# Patient Record
Sex: FEMALE | Race: WHITE | NOT HISPANIC OR LATINO | Employment: OTHER | URBAN - METROPOLITAN AREA
[De-identification: names, ages, dates, MRNs, and addresses within clinical notes are randomized per-mention and may not be internally consistent; named-entity substitution may affect disease eponyms.]

---

## 2017-11-27 ENCOUNTER — HISTORICAL (OUTPATIENT)
Dept: RADIOLOGY | Facility: HOSPITAL | Age: 47
End: 2017-11-27

## 2018-06-01 ENCOUNTER — HISTORICAL (OUTPATIENT)
Dept: ADMINISTRATIVE | Facility: HOSPITAL | Age: 48
End: 2018-06-01

## 2018-06-01 LAB
ABS NEUT (OLG): 3.7
ALBUMIN SERPL-MCNC: 4.7 GM/DL (ref 3.4–5)
ALBUMIN/GLOB SERPL: 1.81 {RATIO} (ref 1.5–2.5)
ALP SERPL-CCNC: 51 UNIT/L (ref 38–126)
ALT SERPL-CCNC: 66 UNIT/L (ref 7–52)
APPEARANCE, UA: CLEAR
AST SERPL-CCNC: 37 UNIT/L (ref 15–37)
BACTERIA #/AREA URNS AUTO: ABNORMAL /HPF
BILIRUB SERPL-MCNC: 0.6 MG/DL (ref 0.2–1)
BILIRUB UR QL STRIP: NEGATIVE MG/DL
BILIRUBIN DIRECT+TOT PNL SERPL-MCNC: 0.1 MG/DL (ref 0–0.5)
BILIRUBIN DIRECT+TOT PNL SERPL-MCNC: 0.5 MG/DL
BUN SERPL-MCNC: 20 MG/DL (ref 7–18)
CALCIUM SERPL-MCNC: 9.7 MG/DL (ref 8.5–10)
CHLORIDE SERPL-SCNC: 105 MMOL/L (ref 98–107)
CHOLEST SERPL-MCNC: 195 MG/DL (ref 0–200)
CHOLEST/HDLC SERPL: 2.6 {RATIO}
CO2 SERPL-SCNC: 28 MMOL/L (ref 21–32)
COLOR UR: YELLOW
CREAT SERPL-MCNC: 0.73 MG/DL (ref 0.6–1.3)
ERYTHROCYTE [DISTWIDTH] IN BLOOD BY AUTOMATED COUNT: 13.6 % (ref 11.5–17)
GLOBULIN SER-MCNC: 2.6 GM/DL (ref 1.2–3)
GLUCOSE (UA): NEGATIVE MG/DL
GLUCOSE SERPL-MCNC: 97 MG/DL (ref 74–106)
HCT VFR BLD AUTO: 39.7 % (ref 37–47)
HDLC SERPL-MCNC: 74 MG/DL (ref 35–60)
HGB BLD-MCNC: 12.9 GM/DL (ref 12–16)
HGB UR QL STRIP: ABNORMAL UNIT/L
KETONES UR QL STRIP: NEGATIVE MG/DL
LDLC SERPL CALC-MCNC: 99 MG/DL (ref 0–129)
LEUKOCYTE ESTERASE UR QL STRIP: NEGATIVE UNIT/L
LYMPHOCYTES # BLD AUTO: 2.3 X10(3)/MCL (ref 0.6–3.4)
LYMPHOCYTES NFR BLD AUTO: 32.7 % (ref 13–40)
MCH RBC QN AUTO: 32.5 PG (ref 27–31.2)
MCHC RBC AUTO-ENTMCNC: 32 GM/DL (ref 32–36)
MCV RBC AUTO: 100 FL (ref 80–94)
MONOCYTES # BLD AUTO: 1 X10(3)/MCL (ref 0–1.8)
MONOCYTES NFR BLD AUTO: 14.1 % (ref 0.1–24)
NEUTROPHILS NFR BLD AUTO: 53.2 % (ref 47–80)
NITRITE UR QL STRIP.AUTO: NEGATIVE
PH UR STRIP: 6.5 [PH]
PLATELET # BLD AUTO: 195 X10(3)/MCL (ref 130–400)
PMV BLD AUTO: 10.6 FL
POTASSIUM SERPL-SCNC: 5.1 MMOL/L (ref 3.5–5.1)
PROT SERPL-MCNC: 7.3 GM/DL (ref 6.4–8.2)
PROT UR QL STRIP: NEGATIVE MG/DL
RBC # BLD AUTO: 3.97 X10(6)/MCL (ref 4.2–5.4)
RBC #/AREA URNS HPF: ABNORMAL /HPF
SODIUM SERPL-SCNC: 138 MMOL/L (ref 136–145)
SP GR UR STRIP: 1.01
SQUAMOUS EPITHELIAL, UA: ABNORMAL /LPF
T3FREE SERPL-MCNC: 2.21 PG/ML (ref 1.45–3.48)
T4 FREE SERPL-MCNC: 1.11 NG/DL (ref 0.76–1.46)
TRIGL SERPL-MCNC: 45 MG/DL (ref 30–150)
TSH SERPL-ACNC: 2.6 MIU/ML (ref 0.35–4.94)
UROBILINOGEN UR STRIP-ACNC: 0.2 MG/DL
VLDLC SERPL CALC-MCNC: 9 MG/DL
WBC # SPEC AUTO: 7 X10(3)/MCL (ref 4.5–11.5)
WBC #/AREA URNS AUTO: ABNORMAL /[HPF]

## 2018-06-28 ENCOUNTER — HISTORICAL (OUTPATIENT)
Dept: ADMINISTRATIVE | Facility: HOSPITAL | Age: 48
End: 2018-06-28

## 2019-02-27 ENCOUNTER — HISTORICAL (OUTPATIENT)
Dept: RADIOLOGY | Facility: HOSPITAL | Age: 49
End: 2019-02-27

## 2019-12-10 ENCOUNTER — HISTORICAL (OUTPATIENT)
Dept: ADMINISTRATIVE | Facility: HOSPITAL | Age: 49
End: 2019-12-10

## 2019-12-10 LAB
ABS NEUT (OLG): 2.6 X10(3)/MCL (ref 2.1–9.2)
ALBUMIN SERPL-MCNC: 4.2 GM/DL (ref 3.4–5)
ALBUMIN/GLOB SERPL: 1.68 {RATIO} (ref 1.5–2.5)
ALP SERPL-CCNC: 36 UNIT/L (ref 38–126)
ALT SERPL-CCNC: 19 UNIT/L (ref 7–52)
APPEARANCE, UA: CLEAR
AST SERPL-CCNC: 18 UNIT/L (ref 15–37)
BACTERIA #/AREA URNS AUTO: ABNORMAL /HPF
BILIRUB SERPL-MCNC: 0.7 MG/DL (ref 0.2–1)
BILIRUB UR QL STRIP: NEGATIVE MG/DL
BILIRUBIN DIRECT+TOT PNL SERPL-MCNC: 0.2 MG/DL (ref 0–0.5)
BILIRUBIN DIRECT+TOT PNL SERPL-MCNC: 0.5 MG/DL
BUN SERPL-MCNC: 18 MG/DL (ref 7–18)
CALCIUM SERPL-MCNC: 9 MG/DL (ref 8.5–10)
CHLORIDE SERPL-SCNC: 104 MMOL/L (ref 98–107)
CHOLEST SERPL-MCNC: 159 MG/DL (ref 0–200)
CHOLEST/HDLC SERPL: 2.4 {RATIO}
CO2 SERPL-SCNC: 29 MMOL/L (ref 21–32)
COLOR UR: YELLOW
CREAT SERPL-MCNC: 0.75 MG/DL (ref 0.6–1.3)
ERYTHROCYTE [DISTWIDTH] IN BLOOD BY AUTOMATED COUNT: 13.1 % (ref 11.5–17)
GLOBULIN SER-MCNC: 2.5 GM/DL (ref 1.2–3)
GLUCOSE (UA): NEGATIVE MG/DL
GLUCOSE SERPL-MCNC: 99 MG/DL (ref 74–106)
HCT VFR BLD AUTO: 35.9 % (ref 37–47)
HDLC SERPL-MCNC: 66 MG/DL (ref 35–60)
HGB BLD-MCNC: 11.8 GM/DL (ref 12–16)
HGB UR QL STRIP: ABNORMAL UNIT/L
KETONES UR QL STRIP: NEGATIVE MG/DL
LDLC SERPL CALC-MCNC: 74 MG/DL (ref 0–129)
LEUKOCYTE ESTERASE UR QL STRIP: NEGATIVE UNIT/L
LYMPHOCYTES # BLD AUTO: 2.5 X10(3)/MCL (ref 0.6–3.4)
LYMPHOCYTES NFR BLD AUTO: 44.9 % (ref 13–40)
MCH RBC QN AUTO: 32 PG (ref 27–31.2)
MCHC RBC AUTO-ENTMCNC: 33 GM/DL (ref 32–36)
MCV RBC AUTO: 97 FL (ref 80–94)
MONOCYTES # BLD AUTO: 0.5 X10(3)/MCL (ref 0.1–1.3)
MONOCYTES NFR BLD AUTO: 9.8 % (ref 0.1–24)
NEUTROPHILS NFR BLD AUTO: 45.3 % (ref 47–80)
NITRITE UR QL STRIP.AUTO: NEGATIVE
PH UR STRIP: 7 [PH]
PLATELET # BLD AUTO: 196 X10(3)/MCL (ref 130–400)
PMV BLD AUTO: 10.5 FL (ref 9.4–12.4)
POTASSIUM SERPL-SCNC: 4.4 MMOL/L (ref 3.5–5.1)
PROT SERPL-MCNC: 6.7 GM/DL (ref 6.4–8.2)
PROT UR QL STRIP: NEGATIVE MG/DL
RBC # BLD AUTO: 3.69 X10(6)/MCL (ref 4.2–5.4)
RBC #/AREA URNS HPF: ABNORMAL /HPF
SODIUM SERPL-SCNC: 138 MMOL/L (ref 136–145)
SP GR UR STRIP: 1.01
SQUAMOUS EPITHELIAL, UA: ABNORMAL /LPF
TRIGL SERPL-MCNC: 61 MG/DL (ref 30–150)
UROBILINOGEN UR STRIP-ACNC: 0.2 MG/DL
VLDLC SERPL CALC-MCNC: 12.2 MG/DL
WBC # SPEC AUTO: 5.6 X10(3)/MCL (ref 4.5–11.5)
WBC #/AREA URNS AUTO: ABNORMAL /[HPF]

## 2020-09-11 ENCOUNTER — HISTORICAL (OUTPATIENT)
Dept: RADIOLOGY | Facility: HOSPITAL | Age: 50
End: 2020-09-11

## 2020-10-21 ENCOUNTER — HISTORICAL (OUTPATIENT)
Dept: ADMINISTRATIVE | Facility: HOSPITAL | Age: 50
End: 2020-10-21

## 2020-10-21 LAB
ABS NEUT (OLG): 4.1 X10(3)/MCL (ref 2.1–9.2)
ALBUMIN SERPL-MCNC: 4.5 GM/DL (ref 3.4–5)
ALBUMIN/GLOB SERPL: 1.88 {RATIO} (ref 1.5–2.5)
ALP SERPL-CCNC: 35 UNIT/L (ref 38–126)
ALT SERPL-CCNC: 16 UNIT/L (ref 7–52)
APPEARANCE, UA: CLEAR
AST SERPL-CCNC: 18 UNIT/L (ref 15–37)
BACTERIA #/AREA URNS AUTO: ABNORMAL /HPF
BILIRUB SERPL-MCNC: 0.7 MG/DL (ref 0.2–1)
BILIRUB UR QL STRIP: NEGATIVE MG/DL
BILIRUBIN DIRECT+TOT PNL SERPL-MCNC: 0.2 MG/DL (ref 0–0.5)
BILIRUBIN DIRECT+TOT PNL SERPL-MCNC: 0.5 MG/DL
BUN SERPL-MCNC: 21 MG/DL (ref 7–18)
CALCIUM SERPL-MCNC: 9.8 MG/DL (ref 8.5–10)
CHLORIDE SERPL-SCNC: 101 MMOL/L (ref 98–107)
CHOLEST SERPL-MCNC: 175 MG/DL (ref 0–200)
CHOLEST/HDLC SERPL: 2.6 {RATIO}
CO2 SERPL-SCNC: 33 MMOL/L (ref 21–32)
COLOR UR: YELLOW
CREAT SERPL-MCNC: 0.84 MG/DL (ref 0.6–1.3)
ERYTHROCYTE [DISTWIDTH] IN BLOOD BY AUTOMATED COUNT: 13.5 % (ref 11.5–17)
ESTRADIOL SERPL HS-MCNC: 111 PG/ML
FSH SERPL-ACNC: 6.64 MIU/ML
GLOBULIN SER-MCNC: 2.4 GM/DL (ref 1.2–3)
GLUCOSE (UA): NEGATIVE MG/DL
GLUCOSE SERPL-MCNC: 101 MG/DL (ref 74–106)
HCT VFR BLD AUTO: 38.5 % (ref 37–47)
HDLC SERPL-MCNC: 67 MG/DL (ref 35–60)
HGB BLD-MCNC: 12.9 GM/DL (ref 12–16)
HGB UR QL STRIP: ABNORMAL UNIT/L
KETONES UR QL STRIP: NEGATIVE MG/DL
LDLC SERPL CALC-MCNC: 97 MG/DL (ref 0–129)
LEUKOCYTE ESTERASE UR QL STRIP: NEGATIVE UNIT/L
LYMPHOCYTES # BLD AUTO: 2.7 X10(3)/MCL (ref 0.6–3.4)
LYMPHOCYTES NFR BLD AUTO: 36.2 % (ref 13–40)
MCH RBC QN AUTO: 32.4 PG (ref 27–31.2)
MCHC RBC AUTO-ENTMCNC: 34 GM/DL (ref 32–36)
MCV RBC AUTO: 97 FL (ref 80–94)
MONOCYTES # BLD AUTO: 0.7 X10(3)/MCL (ref 0.1–1.3)
MONOCYTES NFR BLD AUTO: 9.9 % (ref 0.1–24)
NEUTROPHILS NFR BLD AUTO: 53.9 % (ref 47–80)
NITRITE UR QL STRIP.AUTO: NEGATIVE
PH UR STRIP: 5 [PH]
PLATELET # BLD AUTO: 207 X10(3)/MCL (ref 130–400)
PMV BLD AUTO: 10.8 FL (ref 9.4–12.4)
POTASSIUM SERPL-SCNC: 4.7 MMOL/L (ref 3.5–5.1)
PROT SERPL-MCNC: 6.9 GM/DL (ref 6.4–8.2)
PROT UR QL STRIP: NEGATIVE MG/DL
RBC # BLD AUTO: 3.98 X10(6)/MCL (ref 4.2–5.4)
RBC #/AREA URNS HPF: ABNORMAL /HPF
SODIUM SERPL-SCNC: 139 MMOL/L (ref 136–145)
SP GR UR STRIP: 1.01
SQUAMOUS EPITHELIAL, UA: ABNORMAL /LPF
TRIGL SERPL-MCNC: 62 MG/DL (ref 30–150)
TSH SERPL-ACNC: 1.88 MIU/ML (ref 0.35–4.94)
UROBILINOGEN UR STRIP-ACNC: 0.2 MG/DL
VLDLC SERPL CALC-MCNC: 12.4 MG/DL
WBC # SPEC AUTO: 7.5 X10(3)/MCL (ref 4.5–11.5)
WBC #/AREA URNS AUTO: ABNORMAL /[HPF]

## 2021-09-01 ENCOUNTER — HISTORICAL (OUTPATIENT)
Dept: ADMINISTRATIVE | Facility: HOSPITAL | Age: 51
End: 2021-09-01

## 2021-09-01 LAB
ABS NEUT (OLG): 2.7 X10(3)/MCL (ref 2.1–9.2)
ALBUMIN SERPL-MCNC: 4.4 GM/DL (ref 3.4–5)
ALBUMIN/GLOB SERPL: 1.91 {RATIO} (ref 1.5–2.5)
ALP SERPL-CCNC: 39 UNIT/L (ref 38–126)
ALT SERPL-CCNC: 19 UNIT/L (ref 7–52)
APPEARANCE, UA: CLEAR
AST SERPL-CCNC: 19 UNIT/L (ref 15–37)
BACTERIA #/AREA URNS AUTO: ABNORMAL /HPF
BILIRUB SERPL-MCNC: 0.7 MG/DL (ref 0.2–1)
BILIRUB UR QL STRIP: NEGATIVE MG/DL
BILIRUBIN DIRECT+TOT PNL SERPL-MCNC: 0.2 MG/DL (ref 0–0.5)
BILIRUBIN DIRECT+TOT PNL SERPL-MCNC: 0.5 MG/DL
BUN SERPL-MCNC: 22 MG/DL (ref 7–18)
CALCIUM SERPL-MCNC: 9.7 MG/DL (ref 8.5–10)
CHLORIDE SERPL-SCNC: 102 MMOL/L (ref 98–107)
CHOLEST SERPL-MCNC: 182 MG/DL (ref 0–200)
CHOLEST/HDLC SERPL: 2.4 {RATIO}
CO2 SERPL-SCNC: 33 MMOL/L (ref 21–32)
COLOR UR: YELLOW
CREAT SERPL-MCNC: 0.81 MG/DL (ref 0.6–1.3)
ERYTHROCYTE [DISTWIDTH] IN BLOOD BY AUTOMATED COUNT: 13.8 % (ref 11.5–17)
GLOBULIN SER-MCNC: 2.3 GM/DL (ref 1.2–3)
GLUCOSE (UA): NEGATIVE MG/DL
GLUCOSE SERPL-MCNC: 95 MG/DL (ref 74–106)
HCT VFR BLD AUTO: 39.4 % (ref 37–47)
HDLC SERPL-MCNC: 77 MG/DL (ref 35–60)
HGB BLD-MCNC: 12.9 GM/DL (ref 12–16)
HGB UR QL STRIP: ABNORMAL UNIT/L
KETONES UR QL STRIP: NEGATIVE MG/DL
LDLC SERPL CALC-MCNC: 86 MG/DL (ref 0–129)
LEUKOCYTE ESTERASE UR QL STRIP: NEGATIVE UNIT/L
LYMPHOCYTES # BLD AUTO: 2.3 X10(3)/MCL (ref 0.6–3.4)
LYMPHOCYTES NFR BLD AUTO: 39.8 % (ref 13–40)
MCH RBC QN AUTO: 32.2 PG (ref 27–31.2)
MCHC RBC AUTO-ENTMCNC: 33 GM/DL (ref 32–36)
MCV RBC AUTO: 98 FL (ref 80–94)
MONOCYTES # BLD AUTO: 0.7 X10(3)/MCL (ref 0.1–1.3)
MONOCYTES NFR BLD AUTO: 11.7 % (ref 0.1–24)
NEUTROPHILS NFR BLD AUTO: 48.5 % (ref 47–80)
NITRITE UR QL STRIP.AUTO: NEGATIVE
PH UR STRIP: 7 [PH]
PLATELET # BLD AUTO: 201 X10(3)/MCL (ref 130–400)
PMV BLD AUTO: 11.8 FL (ref 9.4–12.4)
POTASSIUM SERPL-SCNC: 4.8 MMOL/L (ref 3.5–5.1)
PROT SERPL-MCNC: 6.7 GM/DL (ref 6.4–8.2)
PROT UR QL STRIP: NEGATIVE MG/DL
RBC # BLD AUTO: 4.01 X10(6)/MCL (ref 4.2–5.4)
RBC #/AREA URNS HPF: ABNORMAL /HPF
SODIUM SERPL-SCNC: 140 MMOL/L (ref 136–145)
SP GR UR STRIP: 1.02
SQUAMOUS EPITHELIAL, UA: ABNORMAL /LPF
T4 FREE SERPL-MCNC: 0.92 NG/DL (ref 0.76–1.46)
TRIGL SERPL-MCNC: 65 MG/DL (ref 30–150)
TSH SERPL-ACNC: 3.53 MIU/ML (ref 0.35–4.94)
UROBILINOGEN UR STRIP-ACNC: 0.2 MG/DL
VLDLC SERPL CALC-MCNC: 13 MG/DL
WBC # SPEC AUTO: 5.7 X10(3)/MCL (ref 4.5–11.5)
WBC #/AREA URNS AUTO: ABNORMAL /[HPF]

## 2021-09-16 ENCOUNTER — HISTORICAL (OUTPATIENT)
Dept: ADMINISTRATIVE | Facility: HOSPITAL | Age: 51
End: 2021-09-16

## 2021-09-16 LAB — SARS-COV-2 RNA RESP QL NAA+PROBE: NEGATIVE

## 2021-10-20 ENCOUNTER — HISTORICAL (OUTPATIENT)
Dept: RADIOLOGY | Facility: HOSPITAL | Age: 51
End: 2021-10-20

## 2021-11-08 ENCOUNTER — HISTORICAL (OUTPATIENT)
Dept: ADMINISTRATIVE | Facility: HOSPITAL | Age: 51
End: 2021-11-08

## 2021-11-08 LAB — SARS-COV-2 RNA RESP QL NAA+PROBE: NEGATIVE

## 2021-11-17 ENCOUNTER — HISTORICAL (OUTPATIENT)
Dept: ADMINISTRATIVE | Facility: HOSPITAL | Age: 51
End: 2021-11-17

## 2022-04-11 ENCOUNTER — HISTORICAL (OUTPATIENT)
Dept: ADMINISTRATIVE | Facility: HOSPITAL | Age: 52
End: 2022-04-11

## 2022-04-29 VITALS
DIASTOLIC BLOOD PRESSURE: 60 MMHG | BODY MASS INDEX: 25.9 KG/M2 | WEIGHT: 146.19 LBS | HEIGHT: 63 IN | SYSTOLIC BLOOD PRESSURE: 100 MMHG | OXYGEN SATURATION: 96 %

## 2022-09-02 PROBLEM — Z00.00 ENCOUNTER FOR WELLNESS EXAMINATION IN ADULT: Status: ACTIVE | Noted: 2022-09-02

## 2022-10-24 ENCOUNTER — HOSPITAL ENCOUNTER (OUTPATIENT)
Dept: RADIOLOGY | Facility: HOSPITAL | Age: 52
Discharge: HOME | End: 2022-10-24
Attending: OBSTETRICS & GYNECOLOGY
Payer: COMMERCIAL

## 2022-10-24 DIAGNOSIS — Z12.31 ENCOUNTER FOR SCREENING MAMMOGRAM FOR BREAST CANCER: ICD-10-CM

## 2022-10-24 PROCEDURE — 77067 SCR MAMMO BI INCL CAD: CPT | Mod: 26,,, | Performed by: RADIOLOGY

## 2022-10-24 PROCEDURE — 77063 MAMMO DIGITAL SCREENING BILAT WITH TOMO: ICD-10-PCS | Mod: 26,,, | Performed by: RADIOLOGY

## 2022-10-24 PROCEDURE — 77067 MAMMO DIGITAL SCREENING BILAT WITH TOMO: ICD-10-PCS | Mod: 26,,, | Performed by: RADIOLOGY

## 2022-10-24 PROCEDURE — 77063 BREAST TOMOSYNTHESIS BI: CPT | Mod: 26,,, | Performed by: RADIOLOGY

## 2022-10-24 PROCEDURE — 77067 SCR MAMMO BI INCL CAD: CPT | Mod: TC

## 2022-12-05 PROBLEM — Z00.00 ENCOUNTER FOR WELLNESS EXAMINATION IN ADULT: Status: RESOLVED | Noted: 2022-09-02 | Resolved: 2022-12-05

## 2023-07-09 PROBLEM — N18.31 STAGE 3A CHRONIC KIDNEY DISEASE: Status: ACTIVE | Noted: 2023-07-09

## 2023-07-09 PROBLEM — E03.9 HYPOTHYROIDISM: Status: ACTIVE | Noted: 2023-07-09

## 2023-10-09 DIAGNOSIS — Z12.31 ENCOUNTER FOR SCREENING MAMMOGRAM FOR MALIGNANT NEOPLASM OF BREAST: Primary | ICD-10-CM

## 2023-10-16 PROBLEM — Z00.00 ENCOUNTER FOR WELLNESS EXAMINATION IN ADULT: Status: RESOLVED | Noted: 2022-09-02 | Resolved: 2023-10-16

## 2024-01-29 ENCOUNTER — HOSPITAL ENCOUNTER (OUTPATIENT)
Dept: RADIOLOGY | Facility: HOSPITAL | Age: 54
Discharge: HOME | End: 2024-01-29
Attending: OBSTETRICS & GYNECOLOGY
Payer: COMMERCIAL

## 2024-01-29 DIAGNOSIS — Z12.31 ENCOUNTER FOR SCREENING MAMMOGRAM FOR MALIGNANT NEOPLASM OF BREAST: ICD-10-CM

## 2024-01-29 PROCEDURE — 77067 SCR MAMMO BI INCL CAD: CPT | Mod: TC

## 2024-01-29 PROCEDURE — 77063 BREAST TOMOSYNTHESIS BI: CPT | Mod: 26,,, | Performed by: RADIOLOGY

## 2024-01-29 PROCEDURE — 77067 SCR MAMMO BI INCL CAD: CPT | Mod: 26,,, | Performed by: RADIOLOGY

## 2024-02-01 ENCOUNTER — LAB VISIT (OUTPATIENT)
Dept: LAB | Facility: HOSPITAL | Age: 54
End: 2024-02-01
Attending: OBSTETRICS & GYNECOLOGY
Payer: COMMERCIAL

## 2024-02-01 DIAGNOSIS — E34.9 ENDOCRINE DISORDER RELATED TO PUBERTY: Primary | ICD-10-CM

## 2024-02-01 LAB — FSH SERPL-ACNC: 25.19 MIU/ML

## 2024-02-01 PROCEDURE — 83001 ASSAY OF GONADOTROPIN (FSH): CPT

## 2024-02-01 PROCEDURE — 36415 COLL VENOUS BLD VENIPUNCTURE: CPT

## 2024-07-09 DIAGNOSIS — Z00.00 WELLNESS EXAMINATION: Primary | ICD-10-CM

## 2024-07-15 ENCOUNTER — OFFICE VISIT (OUTPATIENT)
Dept: PRIMARY CARE CLINIC | Facility: CLINIC | Age: 54
End: 2024-07-15
Payer: COMMERCIAL

## 2024-07-15 VITALS
SYSTOLIC BLOOD PRESSURE: 148 MMHG | DIASTOLIC BLOOD PRESSURE: 98 MMHG | HEART RATE: 86 BPM | OXYGEN SATURATION: 98 % | TEMPERATURE: 98 F | WEIGHT: 150 LBS | HEIGHT: 63 IN | BODY MASS INDEX: 26.58 KG/M2 | RESPIRATION RATE: 18 BRPM

## 2024-07-15 DIAGNOSIS — M85.80 OSTEOPENIA, UNSPECIFIED LOCATION: ICD-10-CM

## 2024-07-15 DIAGNOSIS — Z00.00 ENCOUNTER FOR WELLNESS EXAMINATION IN ADULT: Primary | ICD-10-CM

## 2024-07-15 DIAGNOSIS — Z23 IMMUNIZATION DUE: ICD-10-CM

## 2024-07-15 DIAGNOSIS — I42.0 DILATED CARDIOMYOPATHY: ICD-10-CM

## 2024-07-15 DIAGNOSIS — I10 ESSENTIAL (PRIMARY) HYPERTENSION: ICD-10-CM

## 2024-07-15 DIAGNOSIS — N18.31 STAGE 3A CHRONIC KIDNEY DISEASE: ICD-10-CM

## 2024-07-15 DIAGNOSIS — E03.9 HYPOTHYROIDISM, UNSPECIFIED TYPE: ICD-10-CM

## 2024-07-15 PROCEDURE — 99396 PREV VISIT EST AGE 40-64: CPT | Mod: ,,, | Performed by: FAMILY MEDICINE

## 2024-07-15 RX ORDER — VALSARTAN 160 MG/1
TABLET ORAL
COMMUNITY
Start: 2024-03-05

## 2024-07-15 RX ORDER — ROSUVASTATIN CALCIUM 10 MG/1
10 TABLET, COATED ORAL NIGHTLY
COMMUNITY

## 2024-07-15 RX ORDER — PREDNISONE 5 MG/1
5 TABLET ORAL
COMMUNITY
Start: 2024-02-19

## 2024-07-15 NOTE — PROGRESS NOTES
..Annual Exam       HISTORY OF PRESENT ILLNESS    This 54 y.o. female patient presented to the clinic today for a wellness CPX.  She has been feeling well.  She had a heart transplant in 2023. She is still on prednisone.  She is on tacrolimus and mycophenolic acid.  She sleeps well.    Her appetite is good.   She exercises 5 days a week:  Cardio/weights/walks/cycling.     No tobacco.   She has wine infrequently.  She is a .     She is  with 1 son.   Gyn: Dr. Kathy SARABIA; saw him last year. Has an appointment in October. MMG 2024.  Endocrine: Dr Carlos Eduardo Reyes; osteopenia. Had DEXA earlier this year.  ENT: Dr. Eddie SARABIA; sees him as needed.  Colonoscopy 2021:  Dr. Wellington; melanosis coli, polyp x 1, repeat in 5 years.  Dr. Michael Sanchez MD at [facility] [place], [place] for heart transplant evaluation.  Dr. Sandra Arias MD; nephrologist at [facility] [place].      The patient's Health Maintenance was reviewed and the following appears to be due at this time:   Health Maintenance Due   Topic Date Due    Annual UACr  Never done    HIV Screening  Never done    Shingles Vaccine (2 of 2) 2021    COVID-19 Vaccine (3 - 2023- season) 10/23/2023    Cervical Cancer Screening  2024       ..  Past Medical History:   Diagnosis Date    Essential (primary) hypertension     Excessive cerumen in ear canal, bilateral     Fatigue           ..  Past Surgical History:   Procedure Laterality Date    CARDIAC DEFIBRILLATOR PLACEMENT       SECTION      WISDOM TOOTH EXTRACTION Bilateral           Current Outpatient Medications   Medication Instructions    calcium-vitamin D 600 mg-10 mcg (400 unit) Tab Calcium 600 + D(3) 600 mg-10 mcg (400 unit) tablet, [RxNorm: 300389]    cyanocobalamin (VITAMIN B-12) 1,000 mcg, Oral    EPINEPHrine (ADRENALIN) 1 mg/mL injection 1 applicator, Injection, 2 times daily PRN    levothyroxine (SYNTHROID) 25 MCG tablet levothyroxine 25 mcg tablet   TAKE 1/2 TABLET DAILY BY MOUTH     melatonin (MELATIN) 1.5 mg, Oral    predniSONE (DELTASONE) 5 mg, Oral    rosuvastatin (CRESTOR) 10 mg, Oral, Nightly    valsartan (DIOVAN) 160 MG tablet Oral         ..  Social History     Socioeconomic History    Marital status:     Number of children: 1   Occupational History    Occupation:    Tobacco Use    Smoking status: Former     Types: Cigarettes    Smokeless tobacco: Never   Substance and Sexual Activity    Alcohol use: Yes     Comment: 1-2 times per week    Drug use: Never     Social Determinants of Health     Financial Resource Strain: Low Risk  (7/15/2024)    Overall Financial Resource Strain (CARDIA)     Difficulty of Paying Living Expenses: Not hard at all   Food Insecurity: No Food Insecurity (7/15/2024)    Hunger Vital Sign     Worried About Running Out of Food in the Last Year: Never true     Ran Out of Food in the Last Year: Never true   Transportation Needs: Patient Declined (2024)    Received from [facility]    IP Exclusion     Is the patient/family able/willing to answer SDOH questions?: No, patient refused   Physical Activity: Sufficiently Active (7/15/2024)    Exercise Vital Sign     Days of Exercise per Week: 5 days     Minutes of Exercise per Session: 60 min   Stress: Stress Concern Present (7/15/2024)    [employer] - Occupational Stress Questionnaire     Feeling of Stress : To some extent   Housing Stability: Unknown (7/15/2024)    Housing Stability Vital Sign     Unable to Pay for Housing in the Last Year: No          ..  Family History   Problem Relation Name Age of Onset    Heart disease Mother      Cancer Father      Cardiomyopathy Sister  59        Heart Transplant    No Known Problems Sister      Depression Brother           of heart problem    Panic disorder Brother      No Known Problems Brother            ..  Review of patient's allergies indicates:   Allergen Reactions    Sulfa (sulfonamide antibiotics) Hives     "      ..  Immunization History   Administered Date(s) Administered    COVID-19, mRNA, LNP-S, bivalent booster, PF (Moderna Omicron)12 + YEARS 07/11/2023    Influenza 09/30/2009, 11/15/2010, 10/31/2011, 10/24/2012, 11/06/2013, 10/09/2014, 11/16/2015, 10/18/2016, 11/20/2017, 11/27/2018, 11/13/2019, 10/21/2020    Influenza - Quadrivalent - PF *Preferred* (6 months and older) 10/09/2014, 11/27/2018, 11/13/2019    Influenza - Trivalent - PF (ADULT) 09/30/2009, 11/15/2010, 10/31/2011, 10/24/2012, 11/06/2013, 10/09/2014, 11/16/2015, 10/18/2016, 11/20/2017, 11/27/2018, 11/13/2019, 10/21/2020    Tdap 08/04/2011, 12/10/2019    Zoster Recombinant 10/18/2021          REVIEW OF SYSTEMS:    GENERAL:   no unexplained wt gain/loss,  fever, fatigue, chills, night sweats or weakness  HEENT: no sore throat, ear pain, sinus pressure, nasal congestion, or rhinorrhea  VISION: no vision changes, glaucoma, cataracts  CARDIAC: no chest pain, palpitations, dyspnea on exertion, orthopnea  RESPIRATORY: no cough, wheezing, sputum production, or SOB  GI: no abdominal pain, n&v, constipation, diarrhea,  blood in stool or  (--)family history of colon cancer    : no dysuria, hematuria, frequency,  urgency, incontinence,  vaginal discharge,  abnormal vaginal bleeding  MUSC/SKEL:  no myalgia, weakness, edema,  arthralgia, or joint effusion  SKIN:  No rash, hives, itching or sores  NEURO:  No headaches, numbness,  tingling, weakness, or dizziness  PSYCH:  No anxiety,  depression,  irritability,  suicidal ideation or hallucinations  ENDO:  No polyuria, polydipsia,  polyphagia  HEME:  No bruising,  lymphadenopathy, bleeding disorders, no anemia       PHYSICAL EXAM:    Visit Vitals  BP (!) 148/98 (BP Location: Left arm)   Pulse 86   Temp 97.6 °F (36.4 °C)   Resp 18   Ht 5' 3" (1.6 m)   Wt 68 kg (150 lb)   SpO2 98%   BMI 26.57 kg/m²       GENERAL:  Well-developed well-nourished white female in NAD, alert and oriented x 3  SKIN:  no rash or abnormal " appearing skin lesions  HEENT:  PERRLA, EOMI, mouth wnl, throat wnl, EAC and TM wnl bilaterally  NECK:  FROM, no lymphadenopathy, no thyroid abnormalities palpable  CHEST:  CTA bilaterally no wheezes, crackles or rubs  CARDIAC:  RRR, no murmurs audible  ABDOMEN:  Soft, nontender, nondistended, NBSx4, no rebound or guarding, no HSM  EXTREMITIES:  no clubbing, cyanosis, or edema.  joints wnl. +2 DP/PT pulse bilaterally  NEURO:  no sensory or motor deficits noted. CN II-XII intact. Gait wnl.           ASSESSMENT AND PLAN     1. Encounter for wellness examination in adult  Overview:  CBC, CMP, Lipids ordered today.   Gyn: Dr. Kathy SARABIA. MMG 10/2022. PAP 9/2021.  ENT: Dr. Eddie SARABIA.   Colonoscopy 9/2021:  Dr. Wellington; melanosis coli, polyp x 1, repeat in 5 years.  Dr. Michael Sanchez MD at [facility] for heart transplant evaluation.  Dr. Sandra Arias MD; nephrologist at [facility].    07/15/2024:   Patient presents for wellness examination.    She has been feeling well.    She is status post heart transplant in October 2023.  Followed by [facility].  She is on tacrolimus, mycophenolic acid and prednisone.  Patient encouraged to remain physically active and continue to exercise regularly.  Patient advised to monitor blood pressures as her blood pressure is elevated this a.m.  Endocrine: Dr. Carlos Eduardo Reyes; osteopenia, hypothyroidism.  Patient had DEXA scan earlier this year.    Colonoscopy 09/2021: Dr Wellington; melanosis coli; polyp x1, repeat in 5 years.  Nephrology: Dr Sandra Arias at [facility].  No labs done as patient has regular extensive lab testing.          2. Dilated cardiomyopathy  Overview:  Followed by Judy SARABIA with [facility] heart transplant team.  Entresto BID. Defibrillator placed 2/2023.    07/15/2024:  Patient is status post heart transplant in October 2023.  She is on tacrolimus, mycophenolic acid and prednisone.  She is doing well; followed by [facility].      3.  Essential (primary) hypertension  Overview:  Stable and well controlled at this time. Continue current treatment. Limit salt in diet. Monitor BP at home.     07/15/2024: Patient states her blood pressure is usually well controlled.    Her blood pressure is elevated at office visit this a.m.   Patient advised to monitor pressures and let us know if they should continue to be elevated.        4. Stage 3a chronic kidney disease  Overview:  Followed by nephrology at [facility].     07/15/2024: Followed by Dr. Arias at [facility].  Last creatinine 1.22 on 06/24/2024.      5. Hypothyroidism, unspecified type  Overview:  Synthroid 25mcg. Followed by Vasu SARABIA.     07/15/2024:  Stable; followed by Dr. Carlos Eduardo Reyes.      6. Osteopenia, unspecified location  Overview:  Stable; followed by Dr. Carlos Eduardo Reyes; felt to be secondary to prednisone.    Patient had DEXA scan earlier this year.      7. Immunization due  Overview:  07/15/2024: Patient advised to get a Shingrix vaccine; benefits and risks reviewed with patient.           ..Follow up in about 1 year (around 7/15/2025) for Wellness.     Future Appointments   Date Time Provider Department Center   7/17/2025 10:15 AM Perfecto Pruitt MD [facility]

## 2024-10-10 LAB
PAP RECOMMENDATION EXT: NORMAL
PAP SMEAR: NORMAL

## 2024-10-14 PROBLEM — Z00.00 ENCOUNTER FOR WELLNESS EXAMINATION IN ADULT: Status: RESOLVED | Noted: 2022-09-02 | Resolved: 2024-10-14

## 2025-02-05 ENCOUNTER — HOSPITAL ENCOUNTER (OUTPATIENT)
Dept: RADIOLOGY | Facility: HOSPITAL | Age: 55
Discharge: HOME | End: 2025-02-05
Attending: OBSTETRICS & GYNECOLOGY
Payer: COMMERCIAL

## 2025-02-05 DIAGNOSIS — Z12.31 ENCOUNTER FOR SCREENING MAMMOGRAM FOR BREAST CANCER: ICD-10-CM

## 2025-02-05 PROCEDURE — 77067 SCR MAMMO BI INCL CAD: CPT | Mod: 26,,, | Performed by: STUDENT IN AN ORGANIZED HEALTH CARE EDUCATION/TRAINING PROGRAM

## 2025-02-05 PROCEDURE — 77063 BREAST TOMOSYNTHESIS BI: CPT | Mod: TC

## 2025-02-05 PROCEDURE — 77063 BREAST TOMOSYNTHESIS BI: CPT | Mod: 26,,, | Performed by: STUDENT IN AN ORGANIZED HEALTH CARE EDUCATION/TRAINING PROGRAM

## 2025-02-13 ENCOUNTER — TELEPHONE (OUTPATIENT)
Dept: PRIMARY CARE CLINIC | Facility: CLINIC | Age: 55
End: 2025-02-13
Payer: COMMERCIAL

## 2025-02-13 ENCOUNTER — PATIENT MESSAGE (OUTPATIENT)
Dept: PRIMARY CARE CLINIC | Facility: CLINIC | Age: 55
End: 2025-02-13
Payer: COMMERCIAL

## 2025-02-13 RX ORDER — GABAPENTIN 100 MG/1
100 CAPSULE ORAL 2 TIMES DAILY
Qty: 60 CAPSULE | Refills: 0 | Status: SHIPPED | OUTPATIENT
Start: 2025-02-13 | End: 2026-02-13

## 2025-02-13 NOTE — TELEPHONE ENCOUNTER
Patient requesting appt for shoulder pain. Earliest available scheduled for 2/18 @ 1030 and placed on wait list. Patient requesting Gabapentin rx for temporary pain relief.

## 2025-02-15 NOTE — PROGRESS NOTES
"Shoulder Pain (Left shoulder pain x 5 days)       HPI:    Patient reports left shoulder pain x5 days.  She has been taking 3 Tylenol a day.  She has also been taking gabapentin twice a day.  She has had this occur previously.  She lifts weights 3 4 days a week; she does not recall straining anything.  She denies any injuries accidents or trauma.  She does sleep on her side a lot.  Her discomfort is actually much better today.  She still has limitation of motion.      Current Outpatient Medications   Medication Instructions    alendronate (FOSAMAX) 70 MG tablet Every 7 days    amlodipine-valsartan (EXFORGE) 5-320 mg per tablet 1 tablet, Daily    aspirin (ECOTRIN) 81 MG EC tablet Take 1 tab PO QD    calcium carbonate (OS-ELSA) 500 mg calcium (1,250 mg) tablet 1 tablet, 3 times daily    calcium-vitamin D 600 mg-10 mcg (400 unit) Tab Calcium 600 + D(3) 600 mg-10 mcg (400 unit) tablet, [RxNorm: 596551]    cyanocobalamin (VITAMIN B-12) 1,000 mcg    EPINEPHrine (ADRENALIN) 1 mg/mL injection 1 applicator, 2 times daily PRN    gabapentin (NEURONTIN) 100 mg, Oral, 2 times daily    levothyroxine (SYNTHROID) 25 MCG tablet levothyroxine 25 mcg tablet   TAKE 1/2 TABLET DAILY BY MOUTH    MAGOX 400 mg (241.3 mg magnesium) tablet 2 tablets, 2 times daily    melatonin (MELATIN) 1.5 mg    mycophenolate (CELLCEPT) 500 mg Tab 2 tablets bid    predniSONE (DELTASONE) 10 MG tablet Take 3 tablets by mouth daily x 5 days.    rosuvastatin (CRESTOR) 10 mg, Nightly    tacrolimus (PROGRAF) 1 MG Cap 4 capules po BID    thyroid, pork, (NP THYROID) 15 mg Tab Take 1 tablet every day by oral route in the morning.         ROS:    See HPI      PE:    ..Visit Vitals  /85   Pulse 87   Temp 98.5 °F (36.9 °C)   Ht 5' 3" (1.6 m)   Wt 70.7 kg (155 lb 12.8 oz)   SpO2 99%   BMI 27.60 kg/m²        General: She is well-developed well-nourished white female in no apparent distress she is alert and oriented.  Left Shoulder:  Normal appearance.  Tender over " subacromial area.  No other areas of tenderness palpable.  She has discomfort with abduction past 45°.  She has discomfort with anterior raise past 45°.  Both of these movements are limited secondary to discomfort.  Positive Neer's.  Positive Harris.        1. Acute bursitis of left shoulder  Overview:  02/18/2025: Patient presents with left shoulder discomfort and limited range of motion x5 days.  Treatment options discussed with patient.  Prednisone 30 mg daily x5 days.  Light activity; avoid lifting for 3-5 days.  Continue Tylenol and gabapentin.  X-rays pending.  Call if symptoms persist, worsen or new symptoms develop.      Orders:  -     X-Ray Shoulder 2 or More Views Left; Future; Expected date: 02/18/2025    Other orders  -     predniSONE (DELTASONE) 10 MG tablet; Take 3 tablets by mouth daily x 5 days.  Dispense: 15 tablet; Refill: 0              ..No follow-ups on file.       Future Appointments   Date Time Provider Department Center   7/17/2025 10:15 AM Perfecto Pruitt MD [facility]

## 2025-02-18 ENCOUNTER — PATIENT MESSAGE (OUTPATIENT)
Dept: PRIMARY CARE CLINIC | Facility: CLINIC | Age: 55
End: 2025-02-18

## 2025-02-18 ENCOUNTER — RESULTS FOLLOW-UP (OUTPATIENT)
Dept: PRIMARY CARE CLINIC | Facility: CLINIC | Age: 55
End: 2025-02-18

## 2025-02-18 ENCOUNTER — OFFICE VISIT (OUTPATIENT)
Dept: PRIMARY CARE CLINIC | Facility: CLINIC | Age: 55
End: 2025-02-18
Payer: COMMERCIAL

## 2025-02-18 VITALS
HEIGHT: 63 IN | HEART RATE: 87 BPM | OXYGEN SATURATION: 99 % | WEIGHT: 155.81 LBS | TEMPERATURE: 99 F | SYSTOLIC BLOOD PRESSURE: 136 MMHG | DIASTOLIC BLOOD PRESSURE: 85 MMHG | BODY MASS INDEX: 27.61 KG/M2

## 2025-02-18 DIAGNOSIS — M75.52 ACUTE BURSITIS OF LEFT SHOULDER: Primary | ICD-10-CM

## 2025-02-18 RX ORDER — PREDNISONE 10 MG/1
TABLET ORAL
Qty: 15 TABLET | Refills: 0 | Status: SHIPPED | OUTPATIENT
Start: 2025-02-18

## 2025-02-18 RX ORDER — AMLODIPINE AND VALSARTAN 5; 320 MG/1; MG/1
1 TABLET ORAL DAILY
COMMUNITY
Start: 2024-03-17

## 2025-02-18 RX ORDER — THYROID 15 MG/1
TABLET ORAL
COMMUNITY
Start: 2025-02-11

## 2025-02-18 RX ORDER — MAGNESIUM OXIDE 400 MG
2 TABLET ORAL 2 TIMES DAILY
COMMUNITY

## 2025-02-18 RX ORDER — MYCOPHENOLATE MOFETIL 500 MG/1
TABLET ORAL
COMMUNITY

## 2025-02-18 RX ORDER — TACROLIMUS 1 MG/1
4 CAPSULE ORAL
COMMUNITY
Start: 2024-12-10 | End: 2025-02-18 | Stop reason: DRUGHIGH

## 2025-02-18 RX ORDER — ASPIRIN 81 MG/1
TABLET ORAL
COMMUNITY
Start: 2023-10-17

## 2025-02-18 RX ORDER — CALCIUM CARBONATE 500(1250)
1 TABLET ORAL 3 TIMES DAILY
COMMUNITY

## 2025-02-18 RX ORDER — ALENDRONATE SODIUM 70 MG/1
TABLET ORAL
COMMUNITY

## 2025-02-18 RX ORDER — TACROLIMUS 1 MG/1
CAPSULE ORAL
COMMUNITY

## 2025-03-17 ENCOUNTER — DOCUMENTATION ONLY (OUTPATIENT)
Facility: CLINIC | Age: 55
End: 2025-03-17
Payer: COMMERCIAL

## 2025-03-17 NOTE — LETTER
AUTHORIZATION FOR RELEASE OF CONFIDENTIAL INFORMATION      2025      Dear Dr. Chavez,    We are seeing Maisha England, date of birth 1970, in the clinic at [facility].  Perfecto Pruitt MD is the patient's PCP. Maisha England has an outstanding lab/procedure at the time we reviewed his chart.  In order to help keep her health information updated, Maisha has authorized us to request the following medical record(s):        Pap Smear         Please fax any records to Perfecto Pruitt MD's at  [phone]              Patient Name: Maisha England  :1970  Patient Phone #:[phone]                                     Maisha England  MRN: [id number]  : 1970  Age: 52 y.o.  Sex: female         Patient/Legal Guardian Signature  This signature was collected at 10/24/2022    maisha england       _______________________________   Printed Name/Relationship to Patient      Consent for Examination and Treatment: I hereby authorize the providers and employees of [facility] ([facility]) to provide medical treatment/services which includes, but is not limited to, performing and administering tests and diagnostic procedures that are deemed necessary, including, but not limited to, imaging examinations, blood tests and other laboratory procedures as may be required by the hospital, clinic, or may be ordered by my physician(s) or persons working under the general and/or special instructions of my physician(s).      I understand and agree that this consent covers all authorized persons, including but not limited to physicians, residents, nurse practitioners, physicians' assistants, specialists, consultants, student nurses, and independently contracted physicians, who are called upon by the physician in charge, to carry out the diagnostic procedures and medical or surgical treatment.     I hereby authorize [facility] to retain or dispose of any specimens or tissue, should there be  such remaining from any test or procedure.     I hereby authorize and give consent for [facility] providers and employees to take photographs, images or videotapes of such diagnostic, surgical or treatment procedures of Patient as may be required by [facility] or as may be ordered by a physician. I further acknowledge and agree that [facility] may use cameras or other devices for patient monitoring.     I am aware that the practice of medicine is not an exact science, and I acknowledge that no guarantees have been made to me as to the outcome of any tests, procedures or treatment.     Authorization for Release of Information: I understand that my insurance company and/or their agents may need information necessary to make determinations about payment/reimbursement. I hereby provide authorization to release to all insurance companies, their successors, assignees, other parties with whom they may have contracted, or others acting on their behalf, that are involved with payment for any hospital and/or clinic charges incurred by the patient, any information that they request and deem necessary for payment/reimbursement, and/or quality review.  I further authorize the release of my health information to physicians or other health care practitioners on staff who are involved in my health care now and in the future, and to other health care providers, entities, or institutions for the purpose of my continued care and treatment, including referrals.     REGISTRATION AUTHORIZATION  Form No. 71242 (Rev. 7/13/2022)       Medicare Patient's Certification and Authorization to Release Information and Payment Request:  I certify that the information given by me in applying for payment under Title XVIII of the Social Security Act is correct. I authorize any downing of medical or other information about me to release to the Social SecurityAdministration, or its intermediaries or carriers, any information needed for this or a related Medicare  claim. I request that payment of authorized benefits be made on my behalf.     Assignment of Insurance Benefits:   I hereby authorize any and all insurance companies, health plans, defined   benefit plans, health insurers or any entity that is or may be responsible for payment of my medical expenses to pay all hospital and medical benefits now due, and to become due and payable to me under any hospital benefits, sick benefits, injury benefits or any other benefit for services rendered to me, including Major Medical Benefits, direct to [facility] and all independently contracted physicians. I assign any and all rights that I may have against any and all insurance companies, health plans, defined benefit plans, health insurers or any entity that is or may be responsible for payment of my medical expenses, including, but not limited to any right to appeal a denial of a claim, any right to bring any action, lawsuit, administrative proceeding, or other cause of action on my behalf. I specifically assign my right to pursue litigation against any and all insurance companies, health plans, defined benefit plans, health insurers or any entity that is or may be responsible for payment of my medical expenses based upon a refusal to pay charges.            E. Valuables: It is understood and agreed that [facility] is not liable for the damage to or loss of any money, jewelry,   documents, dentures, eye glasses, hearing aids, prosthetics, or other property of value.     F. Computer Equipment: I understand and agree that should I choose to use computer equipment owned by [facility] or if I choose to access the Internet via [facility]s network, I do so at my own risk. [facility] is not responsible for any damage to my computer equipment or to any damages of any type that might arise from my loss of equipment or data.     G. Acceptance of Financial Responsibility:  I agree that in consideration of the services and   supplies that have been   or  will be furnished to the patient, I am hereby obligated to pay all charges made for or on the account of the patient according to the standard rates (in effect at the time the services and supplies are delivered) established by [facility], including its Patient Financial Assistance Policy to the extent it is applicable. I understand that I am responsible for all charges, or portions thereof, not covered by insurance or other sources. Patient refunds will be distributed only after balances at all [facility] facilities are paid.     H. Communication Authorization:  I hereby authorize [facility] and its representatives, along with any billing service   or  who may work on their behalf, to contact me on   my cell phone and/or home phone using pre- recorded messages, artificial voice messages, automatic telephone dialing devices or other computer assisted technology, or by electronic      mail, text messaging, or by any other form of electronic communication. This includes, but is not limited to, appointment reminders, yearly physical exam reminders, preventive care reminders, patient campaigns, welcome calls, and calls about account balances on my account or any account on which I am listed as a guarantor. I understand I have the right to opt out of these communications at any time.      Relationship  Between  Facility and  Provider:      I understand that some, but not all, providers furnishing services to the patient are not employees or agents of [facility]. The patient is under the care and supervision of his/her attending physician, and it is the responsibility of the facility and its nursing staff to carry out the instructions of such physicians. It is the responsibility of the patient's physician/designee to obtain the patient's informed consent, when required, for medical or surgical treatment, special diagnostic or therapeutic procedures, or hospital services rendered for the patient under the special  instructions of the physician/designee.     REGISTRATION AUTHORIZATION  Form No. 35443 (Rev. 7/13/2022)      Notice of Privacy Practices: I acknowledge I have received a copy of [facility]'s Notice of Privacy Practices.     Facility  Directory: I have discussed with the organization my desire to be either included or excluded  in the facility directory in the event of my being an inpatient at an [facility] facility. I understand that if my choice is to opt-out of being identified in the facility directory that the facility will not provide any information about me such as my condition (e.g. fair, stable, etc.) or my location in the facility (e.g., room number, department).     TERM: This authorization is valid for this and subsequent care/treatment I receive at [facility] and will remain valid unless/until revoked in writing by me.     [facility]: As used in this document, [facility] means all [facility] owned and managed facilities, including, but not limited to, all health centers, surgery centers, clinics, urgent care centers, and hospitals.         [facility] complies with applicable Federal civil rights laws and does not discriminate on the basis of race, color, national origin, age, disability, or sex.  ATENCIÓN: si habla español, tiene a faustin disposición servicios gratuitos de asistencia lingüística. Ana al [phone].  CHÚ Ý: N?u b?n nói Ti?ng Vi?t, có các d?ch v? h? tr? ngôn ng? mi?n phí dành cho b?n. G?i s? [phone].        REGISTRATION AUTHORIZATION  Form No. 31129 (Rev. 7/13/2022)

## 2025-03-19 ENCOUNTER — DOCUMENTATION ONLY (OUTPATIENT)
Dept: PRIMARY CARE CLINIC | Facility: CLINIC | Age: 55
End: 2025-03-19
Payer: COMMERCIAL

## 2025-03-19 LAB — PAP SMEAR: NORMAL

## 2025-06-23 ENCOUNTER — PATIENT MESSAGE (OUTPATIENT)
Dept: PRIMARY CARE CLINIC | Facility: CLINIC | Age: 55
End: 2025-06-23
Payer: COMMERCIAL

## 2025-07-09 DIAGNOSIS — Z00.00 WELLNESS EXAMINATION: Primary | ICD-10-CM

## 2025-07-12 PROBLEM — Z94.1 STATUS POST HEART TRANSPLANT: Status: ACTIVE | Noted: 2025-07-12

## 2025-07-12 NOTE — PROGRESS NOTES
..Annual Exam       HISTORY OF PRESENT ILLNESS    This 55 y.o. female patient presents to the clinic today for a wellness CPX.  She has been feeling well.  She had a heart transplant in 2023.   She is on tacrolimus and mycophenolic acid.  She is sleeping better with estrogen, progesterone and magnesium.   Pt sees Dr Leone.   Her appetite is good.   She walks 6 days a week. She lifts weights 3-4 days a week:  Cardio/weights/walks/cycling.    No tobacco.   She has wine infrequently.  She is a .  She is  with 1 son.   Gyn: Dr. Kathy SARABIA; sees him yearly. MMG 2025; had right implant rupture. Had bilateral implant removal.  Endocrine: Dr Carlos Eduardo Reyes; osteopenia. Had DEXA in .  ENT: Dr. Eddie SARABIA; sees him as needed.  Colonoscopy 2021:  Dr. Wellington; melanosis coli, polyp x 1, repeat in 5 years.  Dr. Michael Sanchez MD at [facility] for heart transplant evaluation.  Dr. Sandra Arias MD; nephrologist at [facility].      The patient's Health Maintenance was reviewed and the following appears to be due at this time:   Health Maintenance Due   Topic Date Due    Annual UACr  Never done    HIV Screening  Never done    Pneumococcal Vaccines (Age 50+) (1 of 2 - PCV) Never done    Shingles Vaccine (2 of 2) 2021    COVID-19 Vaccine (3 - Moderna risk series) 2023       ..  Past Medical History:   Diagnosis Date    Essential (primary) hypertension     Excessive cerumen in ear canal, bilateral     Fatigue           ..  Past Surgical History:   Procedure Laterality Date    BREAST SURGERY  2025    Breast implant removal    CARDIAC DEFIBRILLATOR PLACEMENT       SECTION      CORONARY ARTERY BYPASS GRAFT  10/21/2023    Heart Transplant Recipient    HEART TRANSPLANT  10/2023    WISDOM TOOTH EXTRACTION Bilateral           Current Outpatient Medications   Medication Instructions    alendronate (FOSAMAX) 70 MG tablet Every 7 days    amlodipine-valsartan (EXFORGE) 5-320 mg  per tablet 1 tablet, Daily    aspirin (ECOTRIN) 81 MG EC tablet Take 1 tab PO QD    calcium carbonate (OS-ELSA) 500 mg calcium (1,250 mg) tablet 1 tablet, 3 times daily    calcium-vitamin D 600 mg-10 mcg (400 unit) Tab Calcium 600 + D(3) 600 mg-10 mcg (400 unit) tablet, [RxNorm: 699122]    cyanocobalamin (VITAMIN B-12) 1,000 mcg    EPINEPHrine (ADRENALIN) 1 mg/mL injection 1 applicator, 2 times daily PRN    estradiol 0.05 mg/24 hr td ptwk 0.05 mg/24 hr PTWK 1 patch, Every 7 days    gabapentin (NEURONTIN) 100 mg, Oral, 2 times daily    MAGOX 400 mg (241.3 mg magnesium) tablet 2 tablets, 2 times daily    melatonin (MELATIN) 1.5 mg    mycophenolate (CELLCEPT) 500 mg Tab 2 tablets bid    progesterone (PROMETRIUM) 100 mg, Daily    rosuvastatin (CRESTOR) 10 mg, Nightly    tacrolimus (PROGRAF) 1 MG Cap 4 capules po BID         ..Social History[1]       ..  Family History   Problem Relation Name Age of Onset    Heart disease Mother Armide     Cancer Father Arroyo     Cardiomyopathy Sister  59        Heart Transplant    No Known Problems Sister      Depression Brother           of heart problem    Panic disorder Brother      No Known Problems Brother            ..  Review of patient's allergies indicates:   Allergen Reactions    Lisinopril Edema, Itching and Swelling    Sulfa (sulfonamide antibiotics) Hives          ..  Immunization History   Administered Date(s) Administered    COVID-19, mRNA, LNP-S, bivalent booster, PF (Moderna Omicron)12 + YEARS 2023, 2023    Influenza 2009, 11/15/2010, 10/31/2011, 10/24/2012, 2013, 10/09/2014, 2015, 10/18/2016, 2017, 2018, 2019, 10/21/2020    Influenza - Quadrivalent - PF *Preferred* (6 months and older) 10/09/2014, 2018, 2019    Influenza - Trivalent - Fluarix, Flulaval, Fluzone, Afluria - PF 2009, 11/15/2010, 10/31/2011, 10/24/2012, 2013, 10/09/2014, 2015, 10/18/2016, 2017, 2018, 2019,  "10/21/2020    Tdap 08/04/2011, 12/10/2019    Zoster Recombinant 10/18/2021          REVIEW OF SYSTEMS:    GENERAL:   no unexplained wt gain/loss,  fever, fatigue, chills, night sweats or weakness  HEENT: no sore throat, ear pain, sinus pressure, nasal congestion, or rhinorrhea  VISION: no vision changes, glaucoma, cataracts  CARDIAC: no chest pain, palpitations, dyspnea on exertion, orthopnea  RESPIRATORY: no cough, wheezing, sputum production, or SOB  GI: no abdominal pain, n&v, constipation, diarrhea,  blood in stool or  (+)family history of colon cancer    : no dysuria, hematuria, frequency,  urgency, incontinence,  vaginal discharge,  abnormal vaginal bleeding  MUSC/SKEL:  no myalgia, weakness, edema,  arthralgia, or joint effusion  SKIN:  No rash, hives, itching or sores  NEURO:  No headaches, numbness,  tingling, weakness, or dizziness  PSYCH:  No anxiety,  depression,  irritability,  suicidal ideation or hallucinations  ENDO:  No polyuria, polydipsia,  polyphagia  HEME:  No bruising,  lymphadenopathy, bleeding disorders, no anemia       PHYSICAL EXAM:    Visit Vitals  /85   Pulse 81   Temp 98 °F (36.7 °C)   Ht 5' 3" (1.6 m)   Wt 71.2 kg (157 lb)   SpO2 98%   BMI 27.81 kg/m²       GENERAL:  Well-developed well-nourished white female in NAD, alert and oriented x 3.  She interacts well.  Her affect is appropriate.  She is accompanied by her   SKIN:  no rash or abnormal appearing skin lesions  HEENT:  PERRLA, EOMI, mouth wnl, throat wnl, EAC and TM wnl bilaterally  NECK:  FROM, no lymphadenopathy, no thyroid abnormalities palpable  CHEST:  CTA bilaterally no wheezes, crackles or rubs  CARDIAC:  RRR, no murmurs audible  ABDOMEN:  Soft, nontender, nondistended, NBSx4, no rebound or guarding, no HSM  EXTREMITIES:  no clubbing, cyanosis, or edema.  joints wnl. +2 DP/PT pulse bilaterally  NEURO:  no sensory or motor deficits noted. CN II-XII intact. Gait wnl.           ASSESSMENT AND PLAN     1. " Encounter for wellness examination in adult  Overview:  CBC, CMP, Lipids ordered today.   Gyn: Dr. Kathy SARABIA. MMG 10/2022. PAP 9/2021.  ENT: Dr. Eddie SARABIA.   Colonoscopy 9/2021:  Dr. Wellington; melanosis coli, polyp x 1, repeat in 5 years.  Dr. Michael Sanchez MD at [facility] for heart transplant evaluation.  Dr. Sandra Arias MD; nephrologist at [facility].    07/15/2024:   Patient presents for wellness examination.    She has been feeling well.    She is status post heart transplant in October 2023.  Followed by [facility].  She is on tacrolimus, mycophenolic acid and prednisone.  Patient encouraged to remain physically active and continue to exercise regularly.  Patient advised to monitor blood pressures as her blood pressure is elevated this a.m.  Endocrine: Dr. Carlos Eduardo Reyes; osteopenia, hypothyroidism.  Patient had DEXA scan earlier this year.    Colonoscopy 09/2021: Dr Wellington; melanosis coli; polyp x1, repeat in 5 years.  Nephrology: Dr Sandra Arias at [facility].  No labs done as patient has regular extensive lab testing.        Assessment & Plan:  Patient presents for wellness examination.    She has been feeling well.    She is status post heart transplant in October 2023.  Followed by [facility].  She is on tacrolimus and mycophenolic acid.   Patient encouraged to remain physically active and continue to exercise regularly.  She sees Dr. Mast, for hormone replacement therapy and management of hypothyroidism.  Colonoscopy 09/2021: Dr Wellington; bib coli; polyp x1, repeat in 5 years.  Nephrology: Dr Sandra Arias at [facility].  CKD 3A; her GFR has been in the 50s.  Osteoporosis: Patient is on alendronate weekly; followed by Dr. Carlos Eduardo Reyes.        2. Status post heart transplant  Overview:  07/17/2025:  Patient is doing well status post October 2023.  She is on tacrolimus and mycophenolic acid for immunosuppressant therapy.  Managed by [facility];   Daniel.       3. Essential (primary) hypertension  Overview:  Stable and well controlled at this time. Continue current treatment. Limit salt in diet. Monitor BP at home.     07/15/2024: Patient states her blood pressure is usually well controlled.    Her blood pressure is elevated at office visit this a.m.   Patient advised to monitor pressures and let us know if they should continue to be elevated.      Assessment & Plan:  Her blood pressure is well controlled; continue current medications.      4. Stage 3a chronic kidney disease  Overview:  Followed by nephrology at [facility].     07/15/2024: Followed by Dr. Arias at [facility].  Last creatinine 1.22 on 06/24/2024.    Assessment & Plan:  Last creatinine 1.11 on 06/30/2025; GFR 59.  Her GFR has been stable in the 50s.    Managed by by Dr. Arias at [facility].       5. Acquired hypothyroidism  Overview:  Synthroid 25mcg. Followed by Vasu SARABIA.     07/15/2024:  Stable; followed by Dr. Carlos Eduardo Reyes.    07/17/2025:  Patient is now on NP thyroid 75 mg daily.  Managed by Dr. Felisha Leone.      6. Osteopenia, unspecified location  Overview:  Stable; followed by Dr. Carlos Eduardo Reyes; felt to be secondary to prednisone.    Patient had DEXA scan earlier this year.    Assessment & Plan:  Patient had DEXA scan last year.    Patient is on alendronate 70 mg weekly.  Continue regular weight-bearing exercise.      7. Myalgia due to statin  Overview:  07/17/2025:  Patient has experienced muscle aches and weakness since being on statin medication.  She has been on CoQ10 for few months without relief.  Patient advised to discuss with transplant MD.           ..Follow up in about 1 year (around 7/17/2026) for Wellness.     Future Appointments   Date Time Provider Department Center   7/23/2026 10:00 AM Perfecto Pruitt MD [facility]                    [1]   Social History  Socioeconomic History    Marital status:     Number of children: 1   Occupational  History    Occupation:    Tobacco Use    Smoking status: Former     Types: Cigarettes     Passive exposure: Past    Smokeless tobacco: Never   Substance and Sexual Activity    Alcohol use: Yes     Alcohol/week: 2.0 standard drinks of alcohol     Types: 2 Glasses of wine per week     Comment: 1-2 times per week    Drug use: Never    Sexual activity: Yes     Partners: Male     Birth control/protection: None     Comment: Menopause     Social Drivers of Health     Financial Resource Strain: Low Risk  (7/17/2025)    Overall Financial Resource Strain (CARDIA)     Difficulty of Paying Living Expenses: Not hard at all   Food Insecurity: No Food Insecurity (7/17/2025)    Hunger Vital Sign     Worried About Running Out of Food in the Last Year: Never true     Ran Out of Food in the Last Year: Never true   Transportation Needs: No Transportation Needs (7/17/2025)    PRAPARE - Transportation     Lack of Transportation (Medical): No     Lack of Transportation (Non-Medical): No   Physical Activity: Sufficiently Active (7/17/2025)    Exercise Vital Sign     Days of Exercise per Week: 7 days     Minutes of Exercise per Session: 40 min   Stress: No Stress Concern Present (7/17/2025)    Stateless [place] [employer] - Occupational Stress Questionnaire     Feeling of Stress : Not at all   Housing Stability: Low Risk  (7/17/2025)    Housing Stability Vital Sign     Unable to Pay for Housing in the Last Year: No     Number of Times Moved in the Last Year: 0     Homeless in the Last Year: No

## 2025-07-17 ENCOUNTER — OFFICE VISIT (OUTPATIENT)
Dept: PRIMARY CARE CLINIC | Facility: CLINIC | Age: 55
End: 2025-07-17
Payer: COMMERCIAL

## 2025-07-17 VITALS
SYSTOLIC BLOOD PRESSURE: 120 MMHG | BODY MASS INDEX: 27.82 KG/M2 | OXYGEN SATURATION: 98 % | HEART RATE: 81 BPM | DIASTOLIC BLOOD PRESSURE: 85 MMHG | TEMPERATURE: 98 F | HEIGHT: 63 IN | WEIGHT: 157 LBS

## 2025-07-17 DIAGNOSIS — Z00.00 ENCOUNTER FOR WELLNESS EXAMINATION IN ADULT: Primary | ICD-10-CM

## 2025-07-17 DIAGNOSIS — M79.10 MYALGIA DUE TO STATIN: ICD-10-CM

## 2025-07-17 DIAGNOSIS — M85.80 OSTEOPENIA, UNSPECIFIED LOCATION: ICD-10-CM

## 2025-07-17 DIAGNOSIS — E03.9 ACQUIRED HYPOTHYROIDISM: ICD-10-CM

## 2025-07-17 DIAGNOSIS — N18.31 STAGE 3A CHRONIC KIDNEY DISEASE: ICD-10-CM

## 2025-07-17 DIAGNOSIS — Z94.1 STATUS POST HEART TRANSPLANT: ICD-10-CM

## 2025-07-17 DIAGNOSIS — I10 ESSENTIAL (PRIMARY) HYPERTENSION: ICD-10-CM

## 2025-07-17 DIAGNOSIS — T46.6X5A MYALGIA DUE TO STATIN: ICD-10-CM

## 2025-07-17 RX ORDER — PROGESTERONE 100 MG/1
100 CAPSULE ORAL DAILY
COMMUNITY
Start: 2025-05-09

## 2025-07-17 RX ORDER — ESTRADIOL 0.05 MG/D
1 PATCH TRANSDERMAL
COMMUNITY
Start: 2025-02-06

## 2025-07-17 NOTE — ASSESSMENT & PLAN NOTE
Patient presents for wellness examination.    She has been feeling well.    She is status post heart transplant in October 2023.  Followed by [facility].  She is on tacrolimus and mycophenolic acid.   Patient encouraged to remain physically active and continue to exercise regularly.  She sees Dr. Mast, for hormone replacement therapy and management of hypothyroidism.  Colonoscopy 09/2021: Dr Wellington; melanosis coli; polyp x1, repeat in 5 years.  Nephrology: Dr Sandra Arias at [facility].  CKD 3A; her GFR has been in the 50s.  Osteoporosis: Patient is on alendronate weekly; followed by Dr. Carlos Eduardo Reyes.

## 2025-07-17 NOTE — ASSESSMENT & PLAN NOTE
Patient had DEXA scan last year.    Patient is on alendronate 70 mg weekly.  Continue regular weight-bearing exercise.

## 2025-07-17 NOTE — ASSESSMENT & PLAN NOTE
Last creatinine 1.11 on 06/30/2025; GFR 59.  Her GFR has been stable in the 50s.    Managed by by Dr. Arias at [facility].